# Patient Record
(demographics unavailable — no encounter records)

---

## 2024-12-17 NOTE — HISTORY OF PRESENT ILLNESS
[FreeTextEntry1] : Patient is here to establish relationship with a primary care physician and to help understand why she is not feeling well [de-identified] : Patient states that she was well until July when she was admitted to [facility] with a blood infection.  She was told that the source of the infection could have been food poisoning but she was not sure.  She states that they almost performed open heart surgery on her because they thought her heart might be infected.  Since then she has developed skin lesions on both the upper and lower extremities and the anterior trunk.  She went to the emergency room over the lesions and one was biopsied.  She later visited a dermatologist who started her on antibiotic.  She does not recall the name of the antibiotic but tomorrow is her last dose.  She will be visiting the dermatologist on Wednesday.  She showed me the biopsy report which was actually of culture that showed methicillin sensitive Staph aureus. She said she gets a few new lesions every week. Patient states she has fatigue and feels rundown.  She is concerned that she has an underlying condition resulting in impaired immunity.

## 2024-12-17 NOTE — ASSESSMENT
[FreeTextEntry1] : . #Skin lesions: Patient's lesions look like healed furuncles.  There were no active particles at this time.  The culture result the patient shared with make supports this diagnosis.  Unclear why patient continues to get additional lesions.  Will check for diabetes with hemoglobin A1c, check HIV, check CBC for white count, will check chemistries for underlying renal or hepatic disease. Patient will see her dermatologist on Wednesday.   #Fatigue: Will check CBC, thyroid function and labs as above.  Will call patient with results.

## 2024-12-17 NOTE — PHYSICAL EXAM
[Normal] : no joint swelling and grossly normal strength and tone [de-identified] : Patient has many circular hyperpigmented lesions about 1 cm in diameter on her upper and lower extremities and upper chest.  None of them appear to be actively inflamed or infected.

## 2024-12-17 NOTE — HISTORY OF PRESENT ILLNESS
[FreeTextEntry1] : Patient is here to establish relationship with a primary care physician and to help understand why she is not feeling well [de-identified] : Patient states that she was well until July when she was admitted to [facility] with a blood infection.  She was told that the source of the infection could have been food poisoning but she was not sure.  She states that they almost performed open heart surgery on her because they thought her heart might be infected.  Since then she has developed skin lesions on both the upper and lower extremities and the anterior trunk.  She went to the emergency room over the lesions and one was biopsied.  She later visited a dermatologist who started her on antibiotic.  She does not recall the name of the antibiotic but tomorrow is her last dose.  She will be visiting the dermatologist on Wednesday.  She showed me the biopsy report which was actually of culture that showed methicillin sensitive Staph aureus. She said she gets a few new lesions every week. Patient states she has fatigue and feels rundown.  She is concerned that she has an underlying condition resulting in impaired immunity.

## 2024-12-17 NOTE — HEALTH RISK ASSESSMENT
[Fair] : ~his/her~ current health as fair  [Yes] : Yes [Never] : Never [de-identified] : Occasional use of alcohol

## 2024-12-17 NOTE — HEALTH RISK ASSESSMENT
[Fair] : ~his/her~ current health as fair  [Yes] : Yes [Never] : Never [de-identified] : Occasional use of alcohol

## 2024-12-17 NOTE — REVIEW OF SYSTEMS
[Nausea] : nausea [Negative] : Psychiatric [FreeTextEntry4] : Patient had a fall last March resulting in a concussion. [de-identified] : See HPI

## 2024-12-17 NOTE — REVIEW OF SYSTEMS
[Nausea] : nausea [Negative] : Psychiatric [FreeTextEntry4] : Patient had a fall last March resulting in a concussion. [de-identified] : See HPI

## 2024-12-17 NOTE — REVIEW OF SYSTEMS
[Nausea] : nausea [Negative] : Psychiatric [FreeTextEntry4] : Patient had a fall last March resulting in a concussion. [de-identified] : See HPI

## 2024-12-17 NOTE — PHYSICAL EXAM
[Normal] : no joint swelling and grossly normal strength and tone [de-identified] : Patient has many circular hyperpigmented lesions about 1 cm in diameter on her upper and lower extremities and upper chest.  None of them appear to be actively inflamed or infected.

## 2024-12-17 NOTE — HEALTH RISK ASSESSMENT
[Fair] : ~his/her~ current health as fair  [Yes] : Yes [Never] : Never [de-identified] : Occasional use of alcohol

## 2024-12-17 NOTE — HISTORY OF PRESENT ILLNESS
[FreeTextEntry1] : Patient is here to establish relationship with a primary care physician and to help understand why she is not feeling well [de-identified] : Patient states that she was well until July when she was admitted to [facility] with a blood infection.  She was told that the source of the infection could have been food poisoning but she was not sure.  She states that they almost performed open heart surgery on her because they thought her heart might be infected.  Since then she has developed skin lesions on both the upper and lower extremities and the anterior trunk.  She went to the emergency room over the lesions and one was biopsied.  She later visited a dermatologist who started her on antibiotic.  She does not recall the name of the antibiotic but tomorrow is her last dose.  She will be visiting the dermatologist on Wednesday.  She showed me the biopsy report which was actually of culture that showed methicillin sensitive Staph aureus. She said she gets a few new lesions every week. Patient states she has fatigue and feels rundown.  She is concerned that she has an underlying condition resulting in impaired immunity.

## 2024-12-17 NOTE — PHYSICAL EXAM
[Normal] : no joint swelling and grossly normal strength and tone [de-identified] : Patient has many circular hyperpigmented lesions about 1 cm in diameter on her upper and lower extremities and upper chest.  None of them appear to be actively inflamed or infected.